# Patient Record
Sex: MALE | Race: WHITE | Employment: FULL TIME | URBAN - NONMETROPOLITAN AREA
[De-identification: names, ages, dates, MRNs, and addresses within clinical notes are randomized per-mention and may not be internally consistent; named-entity substitution may affect disease eponyms.]

---

## 2021-03-30 ENCOUNTER — APPOINTMENT (OUTPATIENT)
Dept: GENERAL RADIOLOGY | Age: 21
End: 2021-03-30
Payer: COMMERCIAL

## 2021-03-30 ENCOUNTER — HOSPITAL ENCOUNTER (EMERGENCY)
Age: 21
Discharge: HOME | End: 2021-03-30
Attending: EMERGENCY MEDICINE
Payer: COMMERCIAL

## 2021-03-30 VITALS
RESPIRATION RATE: 16 BRPM | HEART RATE: 90 BPM | OXYGEN SATURATION: 99 % | WEIGHT: 150 LBS | SYSTOLIC BLOOD PRESSURE: 125 MMHG | TEMPERATURE: 98.6 F | DIASTOLIC BLOOD PRESSURE: 86 MMHG
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
## 2021-03-30 DIAGNOSIS — S62.102A LEFT WRIST FRACTURE, CLOSED, INITIAL ENCOUNTER: Primary | ICD-10-CM

## 2021-03-30 DIAGNOSIS — S01.81XA CHIN LACERATION, INITIAL ENCOUNTER: ICD-10-CM

## 2021-03-30 DIAGNOSIS — S50.812A ABRASION OF LEFT FOREARM, INITIAL ENCOUNTER: ICD-10-CM

## 2021-03-30 PROCEDURE — 73130 X-RAY EXAM OF HAND: CPT

## 2021-03-30 PROCEDURE — 73110 X-RAY EXAM OF WRIST: CPT

## 2021-03-30 PROCEDURE — 29105 APPLICATION LONG ARM SPLINT: CPT

## 2021-03-30 PROCEDURE — 99214 OFFICE O/P EST MOD 30 MIN: CPT

## 2021-03-30 PROCEDURE — 99203 OFFICE O/P NEW LOW 30 MIN: CPT | Performed by: EMERGENCY MEDICINE

## 2021-03-30 PROCEDURE — 6370000000 HC RX 637 (ALT 250 FOR IP): Performed by: EMERGENCY MEDICINE

## 2021-03-30 PROCEDURE — 99213 OFFICE O/P EST LOW 20 MIN: CPT

## 2021-03-30 RX ORDER — TRAMADOL HYDROCHLORIDE 50 MG/1
50 TABLET ORAL EVERY 4 HOURS PRN
Qty: 15 TABLET | Refills: 0 | Status: SHIPPED | OUTPATIENT
Start: 2021-03-30 | End: 2021-04-01

## 2021-03-30 RX ORDER — DIAPER,BRIEF,INFANT-TODD,DISP
EACH MISCELLANEOUS 2 TIMES DAILY
Status: DISCONTINUED | OUTPATIENT
Start: 2021-03-30 | End: 2021-03-30 | Stop reason: HOSPADM

## 2021-03-30 RX ADMIN — BACITRACIN ZINC: 500 OINTMENT TOPICAL at 20:20

## 2021-03-30 ASSESSMENT — ENCOUNTER SYMPTOMS
BACK PAIN: 0
EYE REDNESS: 0
DIARRHEA: 0
WHEEZING: 0
SINUS PRESSURE: 0
ABDOMINAL PAIN: 0
STRIDOR: 0
VOICE CHANGE: 0
SORE THROAT: 0
SHORTNESS OF BREATH: 0
NAUSEA: 0
COUGH: 0
EYE PAIN: 0
EYE DISCHARGE: 0
TROUBLE SWALLOWING: 0
VOMITING: 0

## 2021-03-30 ASSESSMENT — PAIN DESCRIPTION - PAIN TYPE: TYPE: ACUTE PAIN

## 2021-03-30 ASSESSMENT — PAIN DESCRIPTION - FREQUENCY: FREQUENCY: CONTINUOUS

## 2021-03-30 ASSESSMENT — PAIN DESCRIPTION - DESCRIPTORS: DESCRIPTORS: THROBBING

## 2021-03-30 ASSESSMENT — PAIN DESCRIPTION - LOCATION: LOCATION: WRIST

## 2021-03-30 ASSESSMENT — PAIN SCALES - GENERAL: PAINLEVEL_OUTOF10: 10

## 2021-03-30 NOTE — ED PROVIDER NOTES
[address] [facility] Encounter      [address] [facility]       Chief Complaint   Patient presents with    Hand Injury     left wrist     Laceration     chin       Nurses Notes reviewed and I agree except as noted in the HPI. HISTORY OF PRESENT ILLNESS   Luis Hager is a 24 y.o. male who presents 30 minutes after he fell playing basketball in [facility]. He sustained injury to left wrist and he rates pain at 10 out of 10 in severity with swelling that was immediate. No deformity. He has chin laceration with bleeding controlled by direct pressure. He denies loss of conscious, neck or back pain, jaw pain, oral dental trauma, epistaxis, headache, vomiting. Right-hand-dominant. No previous fracture left upper extremity. Up-to-date tetanus  REVIEW OF SYSTEMS     Review of Systems   Constitutional: Negative for appetite change, chills, fatigue, fever and unexpected weight change. HENT: Negative for congestion, ear discharge, ear pain, sinus pressure, sneezing, sore throat, trouble swallowing and voice change. Eyes: Negative for pain, discharge and redness. Respiratory: Negative for cough, shortness of breath, wheezing and stridor. Cardiovascular: Negative for chest pain and leg swelling. Gastrointestinal: Negative for abdominal pain, diarrhea, nausea and vomiting. Genitourinary: Negative for dysuria, frequency, hematuria and urgency. Musculoskeletal: Positive for joint swelling. Negative for arthralgias, back pain, myalgias and neck pain. Left wrist pain and swelling after fall   Skin: Positive for wound. Negative for rash. Chin laceration, abrasions left forearm   Neurological: Negative for dizziness, syncope, weakness and headaches. Hematological: Negative for adenopathy. Psychiatric/Behavioral: Negative for behavioral problems, confusion, sleep disturbance and suicidal ideas. The patient is not nervous/anxious.     All other systems reviewed and are Comments: No meningismus  Cardiovascular:      Rate and Rhythm: Normal rate and regular rhythm. Pulses: Normal pulses. Heart sounds: Normal heart sounds, S1 normal and S2 normal. No murmur. No friction rub. No gallop. Pulmonary:      Effort: Pulmonary effort is normal. No tachypnea or respiratory distress. Breath sounds: Normal breath sounds. No stridor. No decreased breath sounds, wheezing, rhonchi or rales. Comments: No Cough, chest atraumatic, lungs clear  Chest:      Chest wall: No tenderness. Abdominal:      General: Bowel sounds are normal. There is no distension. Palpations: Abdomen is soft. There is no mass. Tenderness: There is no abdominal tenderness. There is no right CVA tenderness, left CVA tenderness, guarding or rebound. Comments: Soft nontender   Musculoskeletal:      Left wrist: He exhibits decreased range of motion, tenderness, bony tenderness and swelling. He exhibits no effusion, no deformity and no laceration. Lymphadenopathy:      Cervical: Cervical adenopathy present. Right cervical: Superficial cervical adenopathy present. Left cervical: Superficial cervical adenopathy present. Skin:     General: Skin is warm and dry. Findings: Abrasion and laceration present. No erythema or rash. Comments: Linear superficial laceration of the chin approximately 1 cm no deep structure injury or foreign body. No active bleeding. Abrasions left forearm   Neurological:      Mental Status: He is alert and oriented to person, place, and time. Cranial Nerves: No cranial nerve deficit. Motor: No abnormal muscle tone. Coordination: Coordination normal.      Deep Tendon Reflexes: Reflexes are normal and symmetric. Reflexes normal.      Comments: Appropriate, no focal findings. Distal neurovascular intact left upper extremity   Psychiatric:         Behavior: Behavior normal.         Thought Content:  Thought content normal. Judgment: Judgment normal.         DIAGNOSTIC RESULTS   Labs: No results found for this visit on 03/30/21. IMAGING:  XR HAND LEFT (MIN 3 VIEWS)   Final Result       1. Mildly comminuted, minimally displaced fracture of the distal radial metaphysis. 2. Small ulnar styloid fracture. **This report has been created using voice recognition software. It may contain minor errors which are inherent in voice recognition technology. **      Final report electronically signed by Dr. Chuy Dubois MD on 3/30/2021 8:04 PM      XR WRIST LEFT (MIN 3 VIEWS)   Final Result       1. Mildly comminuted, minimally displaced fracture of the distal radial metaphysis. 2. Small ulnar styloid fracture. **This report has been created using voice recognition software. It may contain minor errors which are inherent in voice recognition technology. **      Final report electronically signed by Dr. Chuy Dubois MD on 3/30/2021 8:04 PM        URGENT CARE COURSE:     Vitals:    03/30/21 1937   BP: 125/86   Pulse: 90   Resp: 16   Temp: 98.6 °F (37 °C)   TempSrc: Oral   SpO2: 99%   Weight: 150 lb (68 kg)       Medications - No data to displayTolerated well  PROCEDURES  Steri-Strip repair chin laceration-patient received copious amounts of wound cleansing spray and Steri-Strip application for laceration repair by nursing staff to reapproximate wound edges well. Patient tolerated well without complication. Splint application by nursing staff:  Left long-arm posterior splint applied by nursing staff-Patient received cleaning of abrasions and bacitracin ointment covered by nonstick dressing and copious amounts of soft roll for padding and left long-arm posterior OCL splint. Patient tolerated well neurovascular intact after application and improved. Patient received shoulder sling. No complications  FINALIMPRESSION      1. Left wrist fracture, closed, initial encounter    2.  Chin laceration, initial encounter    3. Abrasion of left forearm, initial encounter    4. Fall from ground level    5. Injury while playing basketball        DISPOSITION/PLAN   DISPOSITION    Nontoxic, well-hydrated, normal airway. Patient has closed left wrist fracture minimally displaced and neurovascular intact. In addition he has chin laceration without closed head injury, injury of bony spinal column or injury to spinal cord. Patient received OCL splint, sling and Steri-Strip repair of chin laceration. Patient tolerated well without complications,and improved. Patient to follow-up with [facility] tomorrow morning for casting, and patient understands to go to ED if worse. Patient given written instructions regarding wrist fracture and wound care. He may use Tylenol and tramadol for pain and is to maintain RICE treatment of left upper extremity. PATIENT REFERRED TO:  [address]  Schedule an appointment as soon as possible for a visit in 1 day  Follow-up at [facility], go to emergency if worse    DISCHARGE MEDICATIONS:  Discharge Medication List as of 3/30/2021  8:06 PM      START taking these medications    Details   traMADol (ULTRAM) 50 MG tablet Take 1 tablet by mouth every 4 hours as needed for Pain (Take 500 mg of Tylenol with every tramadol every 4 hours for pain) for up to 15 doses. Intended supply: 3 days. Take lowest dose possible to manage pain.   Caution will cause drowsiness, Disp-15 tab let, R-0Print           Discharge Medication List as of 3/30/2021  8:06 PM          MD Ruben Martinez MD  03/31/21 0005

## 2021-03-30 NOTE — ED TRIAGE NOTES
Pt to room 8 with his mother with c/o left wrist pain/injury and a chin laceration. He reports he was playing basketball with friends at a park in [address] [place] [address] approx 1/2 hour ago and fell after jumping up to dunk a basketball. Pt is holding his left hand with his right hand and is unable to move his left wrist. He c/o of pain at 10/10.

## 2021-03-30 NOTE — LETTER
[address] [facility]  [address] [place] [id number]  Phone: [phone]               March 30, 2021    Patient: Alvaro Briscoe   YOB: 2000   Date of Visit: 3/30/2021       To Whom It May Concern:    Annabelle Chapman was seen and treated in our emergency department on 3/30/2021. He may return to work on 4/1/2021.   No work March 30 and March 31, 2021      Sincerely,       Eliana Weston MD         Signature:__________________________________

## 2021-03-31 NOTE — ED NOTES
Non stick dressing with bacitracin ointment applied to left forearm     Janie Teran RN  03/30/21 2021

## 2021-03-31 NOTE — ED NOTES
Chin laceration cleansed with wound wash and patted dry. Several steri strips applied to chin lac in a amberly cross pattern. Pt tolerated well.      Charline Simon RN  03/30/21 2013